# Patient Record
Sex: MALE | Race: WHITE | Employment: FULL TIME | URBAN - NONMETROPOLITAN AREA
[De-identification: names, ages, dates, MRNs, and addresses within clinical notes are randomized per-mention and may not be internally consistent; named-entity substitution may affect disease eponyms.]

---

## 2017-11-01 ENCOUNTER — APPOINTMENT (OUTPATIENT)
Dept: GENERAL RADIOLOGY | Age: 52
End: 2017-11-01
Payer: COMMERCIAL

## 2017-11-01 ENCOUNTER — HOSPITAL ENCOUNTER (EMERGENCY)
Age: 52
Discharge: HOME | End: 2017-11-01
Payer: COMMERCIAL

## 2017-11-01 VITALS
TEMPERATURE: 97 F | RESPIRATION RATE: 20 BRPM | WEIGHT: 195 LBS | DIASTOLIC BLOOD PRESSURE: 98 MMHG | BODY MASS INDEX: 30.61 KG/M2 | SYSTOLIC BLOOD PRESSURE: 159 MMHG | HEIGHT: 67 IN | HEART RATE: 69 BPM | OXYGEN SATURATION: 96 %

## 2017-11-01 DIAGNOSIS — M54.41 RIGHT-SIDED LOW BACK PAIN WITH RIGHT-SIDED SCIATICA, UNSPECIFIED CHRONICITY: Primary | ICD-10-CM

## 2017-11-01 PROCEDURE — 6370000000 HC RX 637 (ALT 250 FOR IP): Performed by: PHYSICIAN ASSISTANT

## 2017-11-01 PROCEDURE — 72100 X-RAY EXAM L-S SPINE 2/3 VWS: CPT

## 2017-11-01 PROCEDURE — 99283 EMERGENCY DEPT VISIT LOW MDM: CPT

## 2017-11-01 PROCEDURE — 6360000002 HC RX W HCPCS: Performed by: PHYSICIAN ASSISTANT

## 2017-11-01 PROCEDURE — 96372 THER/PROPH/DIAG INJ SC/IM: CPT

## 2017-11-01 PROCEDURE — 99282 EMERGENCY DEPT VISIT SF MDM: CPT | Performed by: PHYSICIAN ASSISTANT

## 2017-11-01 RX ORDER — HYDROCODONE BITARTRATE AND ACETAMINOPHEN 5; 325 MG/1; MG/1
1 TABLET ORAL ONCE
Status: COMPLETED | OUTPATIENT
Start: 2017-11-01 | End: 2017-11-01

## 2017-11-01 RX ORDER — ORPHENADRINE CITRATE 30 MG/ML
60 INJECTION INTRAMUSCULAR; INTRAVENOUS ONCE
Status: COMPLETED | OUTPATIENT
Start: 2017-11-01 | End: 2017-11-01

## 2017-11-01 RX ORDER — METHYLPREDNISOLONE SODIUM SUCCINATE 125 MG/2ML
125 INJECTION, POWDER, LYOPHILIZED, FOR SOLUTION INTRAMUSCULAR; INTRAVENOUS ONCE
Status: COMPLETED | OUTPATIENT
Start: 2017-11-01 | End: 2017-11-01

## 2017-11-01 RX ORDER — METHYLPREDNISOLONE 4 MG/1
TABLET ORAL
Qty: 1 KIT | Refills: 0 | Status: SHIPPED | OUTPATIENT
Start: 2017-11-01 | End: 2017-11-15

## 2017-11-01 RX ORDER — CYCLOBENZAPRINE HCL 10 MG
10 TABLET ORAL 3 TIMES DAILY PRN
Qty: 15 TABLET | Refills: 0 | Status: SHIPPED | OUTPATIENT
Start: 2017-11-01 | End: 2017-11-11

## 2017-11-01 RX ORDER — HYDROCODONE BITARTRATE AND ACETAMINOPHEN 5; 325 MG/1; MG/1
1 TABLET ORAL EVERY 6 HOURS PRN
Qty: 15 TABLET | Refills: 0 | Status: SHIPPED | OUTPATIENT
Start: 2017-11-01 | End: 2017-11-08

## 2017-11-01 RX ADMIN — HYDROCODONE BITARTRATE AND ACETAMINOPHEN 1 TABLET: 5; 325 TABLET ORAL at 13:28

## 2017-11-01 RX ADMIN — ORPHENADRINE CITRATE 60 MG: 30 INJECTION INTRAMUSCULAR; INTRAVENOUS at 12:27

## 2017-11-01 RX ADMIN — METHYLPREDNISOLONE SODIUM SUCCINATE 125 MG: 125 INJECTION, POWDER, FOR SOLUTION INTRAMUSCULAR; INTRAVENOUS at 12:30

## 2017-11-01 ASSESSMENT — PAIN SCALES - GENERAL: PAINLEVEL_OUTOF10: 3

## 2017-11-01 NOTE — ED PROVIDER NOTES
11/1/2017    Chief Complaint:   Chief Complaint   Patient presents with    Back Pain     radiating down right leg x 1 week, no injury       HPI: Gerald Kiser is a 50 y.o. male who presents to the emergency department  complaining of back pain. This pain has been present for 1 week. No trauma, twisting or lifting injury. Pain does  radiate down the right leg. The patient denies any bowel and/or bladder dysfunction, including incontinence and retention. The patient denies saddle anesthesia. This pain has not woken the patient at night. The patient denies tingling, numbness, and weakness. There have been no distal neurologic symptoms. Denies fever/chills. Denies recent trauma. PMH:   Past Medical History:   Diagnosis Date    Bulging lumbar disc        PSH:   Past Surgical History:   Procedure Laterality Date    DENTAL SURGERY         Meds:   Discharge Medication List as of 11/1/2017  1:31 PM          SH:   Social History     Social History    Marital status:      Spouse name: N/A    Number of children: N/A    Years of education: N/A     Social History Main Topics    Smoking status: Current Some Day Smoker    Smokeless tobacco: Never Used    Alcohol use Yes      Comment: occasional    Drug use: No    Sexual activity: Not Asked     Other Topics Concern    None     Social History Narrative    None        ROS: 10 system review of systems is negative except per HPI. Allergies: Review of patient's allergies indicates no known allergies. Physical Exam:   Triage vitals were reviewed. ED Triage Vitals [11/01/17 1135]   BP Temp Temp Source Pulse Resp SpO2 Height Weight   (!) 159/98 97 °F (36.1 °C) Temporal 69 20 96 % 5' 7\" (1.702 m) 195 lb (88.5 kg)       Nursing notes reviewed. Medication list, allergies, PMH, PSH reviewed. Constitutional: This patient is lying in bed in no acute distress. The patient is talking comfortably in complete sentences.      Eyes: Pupils are malignancy, epidural abscess, cauda equina syndrome, or other serious spinal cord pathology, as the patient does not have significant risk factors as listed in the HPI. I will advise the patient to follow up with their primary care team for continued pain management as needed. Laboratory Studies: Labs Reviewed - No data to display     Vitals:    11/01/17 1135   BP: (!) 159/98   Pulse: 69   Resp: 20   Temp: 97 °F (36.1 °C)   TempSrc: Temporal   SpO2: 96%   Weight: 195 lb (88.5 kg)   Height: 5' 7\" (1.702 m)           Final Diagnostic Impression:    1. Right-sided low back pain with right-sided sciatica, unspecified chronicity          ED Disposition/Plan:  DISPOSITION Decision to Discharge    Pt was told that if symptoms worsen or new symptoms develop they are to return to the emergency department. Patient to follow up. Patient  was educated on diagnosis and treatment plan. All of patient's questions were answered, and  the patient understands the discharge plan. I do not feel the patient has a life-threatening condition at this time. Patient is to be discharged. Brigida Khoury PATIENT REFERRED TO:  [address]   [address] [place] [id number] [phone]  Schedule an appointment as soon as possible for a visit         DISCHARGE MEDICATIONS:  Discharge Medication List as of 11/1/2017  1:31 PM      START taking these medications    Details   HYDROcodone-acetaminophen (NORCO) 5-325 MG per tablet Take 1 tablet by mouth every 6 hours as needed for Pain ., Disp-15 tablet, R-0Print      cyclobenzaprine (FLEXERIL) 10 MG tablet Take 1 tablet by mouth 3 times daily as needed for Muscle spasms, Disp-15 tablet, R-0Print      methylPREDNISolone (MEDROL, ELEN,) 4 MG tablet Take by mouth., Disp-1 kit, R-0Print                 DONNELL Stein [place]  11/01/17 7838

## 2017-11-02 ENCOUNTER — HOSPITAL ENCOUNTER (EMERGENCY)
Age: 52
Discharge: HOME | End: 2017-11-02
Attending: EMERGENCY MEDICINE
Payer: COMMERCIAL

## 2017-11-02 VITALS
BODY MASS INDEX: 29.35 KG/M2 | DIASTOLIC BLOOD PRESSURE: 88 MMHG | TEMPERATURE: 97.8 F | HEART RATE: 55 BPM | WEIGHT: 187 LBS | SYSTOLIC BLOOD PRESSURE: 134 MMHG | OXYGEN SATURATION: 95 % | RESPIRATION RATE: 18 BRPM | HEIGHT: 67 IN

## 2017-11-02 DIAGNOSIS — M54.31 SCIATICA OF RIGHT SIDE: Primary | ICD-10-CM

## 2017-11-02 DIAGNOSIS — M79.604 RIGHT LEG PAIN: ICD-10-CM

## 2017-11-02 PROCEDURE — 99282 EMERGENCY DEPT VISIT SF MDM: CPT

## 2017-11-02 PROCEDURE — 6360000002 HC RX W HCPCS: Performed by: EMERGENCY MEDICINE

## 2017-11-02 PROCEDURE — 20552 NJX 1/MLT TRIGGER POINT 1/2: CPT

## 2017-11-02 PROCEDURE — 6370000000 HC RX 637 (ALT 250 FOR IP): Performed by: EMERGENCY MEDICINE

## 2017-11-02 PROCEDURE — 96372 THER/PROPH/DIAG INJ SC/IM: CPT

## 2017-11-02 PROCEDURE — 99283 EMERGENCY DEPT VISIT LOW MDM: CPT | Performed by: EMERGENCY MEDICINE

## 2017-11-02 RX ORDER — IBUPROFEN 200 MG
600 TABLET ORAL ONCE
Status: COMPLETED | OUTPATIENT
Start: 2017-11-02 | End: 2017-11-02

## 2017-11-02 RX ORDER — MORPHINE SULFATE 10 MG/ML
8 INJECTION, SOLUTION INTRAMUSCULAR; INTRAVENOUS ONCE
Status: COMPLETED | OUTPATIENT
Start: 2017-11-02 | End: 2017-11-02

## 2017-11-02 RX ORDER — DEXAMETHASONE SODIUM PHOSPHATE 10 MG/ML
10 INJECTION INTRAMUSCULAR; INTRAVENOUS ONCE
Status: COMPLETED | OUTPATIENT
Start: 2017-11-02 | End: 2017-11-02

## 2017-11-02 RX ORDER — BUPIVACAINE HYDROCHLORIDE 5 MG/ML
10 INJECTION, SOLUTION PERINEURAL ONCE
Status: DISCONTINUED | OUTPATIENT
Start: 2017-11-02 | End: 2017-11-02 | Stop reason: HOSPADM

## 2017-11-02 RX ADMIN — DEXAMETHASONE SODIUM PHOSPHATE 10 MG: 10 INJECTION INTRAMUSCULAR; INTRAVENOUS at 18:12

## 2017-11-02 RX ADMIN — MORPHINE SULFATE 8 MG: 10 INJECTION, SOLUTION INTRAMUSCULAR; INTRAVENOUS at 18:12

## 2017-11-02 RX ADMIN — IBUPROFEN 600 MG: 200 TABLET, FILM COATED ORAL at 18:11

## 2017-11-02 ASSESSMENT — ENCOUNTER SYMPTOMS
CONSTIPATION: 0
DIARRHEA: 0
COUGH: 0
SHORTNESS OF BREATH: 0
RHINORRHEA: 0
VOMITING: 0
BACK PAIN: 1
SORE THROAT: 0
CHEST TIGHTNESS: 0
BLOOD IN STOOL: 0
TROUBLE SWALLOWING: 0
ABDOMINAL DISTENTION: 0
NAUSEA: 0
ABDOMINAL PAIN: 0

## 2017-11-02 ASSESSMENT — PAIN DESCRIPTION - DESCRIPTORS: DESCRIPTORS: CONSTANT;ACHING;NUMBNESS

## 2017-11-02 ASSESSMENT — PAIN DESCRIPTION - ORIENTATION: ORIENTATION: RIGHT

## 2017-11-02 ASSESSMENT — PAIN DESCRIPTION - PAIN TYPE: TYPE: ACUTE PAIN

## 2017-11-02 ASSESSMENT — PAIN DESCRIPTION - LOCATION: LOCATION: LEG

## 2017-11-02 ASSESSMENT — PAIN SCALES - GENERAL: PAINLEVEL_OUTOF10: 3

## 2017-11-02 NOTE — ED PROVIDER NOTES
side    2.  Right leg pain          DISPOSITION/PLAN   DISPOSITION Decision to Discharge    PATIENT REFERRED TO:  [place] [address]  [phone]    Schedule an appointment as soon as possible for a visit in 3 days        DISCHARGE MEDICATIONS:  Discharge Medication List as of 11/2/2017  7:16 PM             (Please note that portions of this note were completed with a voice recognition program.  Efforts were made to edit the dictations but occasionally words are mis-transcribed.)    Nichelle Santiago MD (electronically signed)  Attending Emergency Physician          Nichelle Santiago MD  11/02/17 2287

## 2017-11-02 NOTE — ED NOTES
Pt was seen here yesterday and discharged. Pt wife was at Dr Patel, [employer] office today with a family member and mentioned about her .  He instructed them to come to the ER and have the ER call him       Hamzah Rae RN  11/02/17 2611

## 2017-11-08 ENCOUNTER — TELEPHONE (OUTPATIENT)
Dept: NEUROSURGERY | Age: 52
End: 2017-11-08

## 2017-11-08 NOTE — TELEPHONE ENCOUNTER
Neurosurgical pre apt questionnaire     Referring physician? ER REFERRAL    Who is completing questionnaire? PATIENT     Has the pt had any previous spinal/brain surgeries? NO    If yes, Where was the surgery preformed? What was the surgery? Who was the surgeon? When was this surgery? Why is the pt not following up with previous surgeon? Is this a second opinion? Was a MRI preformed? NO    If not, Is there any reason a MRI cannot be performed? Is there metal anywhere in the body? If yes,  Where was the MRI preformed? What part of the body? When was it preformed? Note:If  was not the facility that performed the study,    the disc will need to be brought appoint. Physical Therapy? NO   If yes, where was PT completed? What is the duration of therapy? Pain Management? NO   If yes, where was pain mgt therapy? Is the patient still under contract with pain mgt? Who is the pain mgt physician? Is the pt currently taking anything for pain control? Employment Status ? 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 2 WEEKS   If unemployed, how long? Are you on disability? Symptoms?         RIGHT SCIATIC PAIN

## 2017-11-15 ENCOUNTER — OFFICE VISIT (OUTPATIENT)
Dept: NEUROSURGERY | Age: 52
End: 2017-11-15
Payer: COMMERCIAL

## 2017-11-15 ENCOUNTER — HOSPITAL ENCOUNTER (OUTPATIENT)
Dept: GENERAL RADIOLOGY | Age: 52
Discharge: HOME | End: 2017-11-15
Payer: COMMERCIAL

## 2017-11-15 VITALS
HEART RATE: 71 BPM | OXYGEN SATURATION: 99 % | BODY MASS INDEX: 28.72 KG/M2 | WEIGHT: 183 LBS | HEIGHT: 67 IN | SYSTOLIC BLOOD PRESSURE: 146 MMHG | DIASTOLIC BLOOD PRESSURE: 99 MMHG

## 2017-11-15 DIAGNOSIS — R20.0 NUMBNESS AND TINGLING OF RIGHT LEG: ICD-10-CM

## 2017-11-15 DIAGNOSIS — M25.551 RIGHT HIP PAIN: ICD-10-CM

## 2017-11-15 DIAGNOSIS — M25.551 RIGHT HIP PAIN: Primary | ICD-10-CM

## 2017-11-15 DIAGNOSIS — R20.2 NUMBNESS AND TINGLING OF RIGHT LEG: ICD-10-CM

## 2017-11-15 DIAGNOSIS — M79.604 RIGHT LEG PAIN: ICD-10-CM

## 2017-11-15 DIAGNOSIS — M79.18 RIGHT BUTTOCK PAIN: ICD-10-CM

## 2017-11-15 PROCEDURE — 72120 X-RAY BEND ONLY L-S SPINE: CPT

## 2017-11-15 PROCEDURE — 99204 OFFICE O/P NEW MOD 45 MIN: CPT | Performed by: NURSE PRACTITIONER

## 2017-11-15 ASSESSMENT — ENCOUNTER SYMPTOMS
BACK PAIN: 0
RESPIRATORY NEGATIVE: 1
HEARTBURN: 0
VOMITING: 0
NAUSEA: 0
DIARRHEA: 0
EYES NEGATIVE: 1
BLOOD IN STOOL: 0
ABDOMINAL PAIN: 0
CONSTIPATION: 1

## 2017-11-15 NOTE — PROGRESS NOTES
[facility] Neurosurgery  Office Visit    Patient:   Nadine Kiser  MR#:    [id number]      YOB: 1965  Date of Visit:   11/15/2017  Time of Note:                          10:38 AM  Primary/Referring Physician:  No primary care provider on file. 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 The pain does radiate into right buttock, posterolateral thigh, lateral aspect of shin, and to the ankle. His pain is mostly located in the right hip and leg. The patient complains of numbness along the same distribution as described above. He states that sitting makes the pain worse. He states that he has not really had any left leg pain just occasional left toes will tingle. He states that resting lying down would relieve the pain and in about 30 minutes the pain would go away. He states that he has been performed PT exercises that have helped the pain. He states the pain got so bad he went to the ED, they gave him a steroid injection and pain medications. They sent him home and he states the next day he got worse and went back to the ED, they gave him morphine and another injection in his right hip which he cannot recall what it was. He states that he has been missing work trying to do half days but he states that he is in excruciating pain. His pain is not changed when going from a seated to standing position. His pain is not changed with walking. His pain is improved when lying flat. Overall, indicative that the patient does have a mechanical nature to their pain. He states that 0% of his pain is located in the back and 100% is leg pain.     The patient states that he can no longer sit without pain which has dramatically affected his quality of life. The patient has underwent a non-operative treatment course that has included:  NSAIDs (ibuprofen)  Muscle Relaxers (flexeril)  Opiates (Norco)  Oral Steroids (medrol dose pack)  Physical Therapy with core strengthening  Chiropractic Manipulation (no change)  TENS unit    Of note he does occasionally use tobacco and does not take blood thinning medications. Past Medical History:   Diagnosis Date    Bulging lumbar disc     Hypertension        Past Surgical History:   Procedure Laterality Date    DENTAL SURGERY         No current outpatient prescriptions on file. No current facility-administered medications for this visit. Allergies:  Review of patient's allergies indicates no known allergies. Social History:   History   Smoking Status    Current Some Day Smoker   Smokeless Tobacco    Never Used     History   Alcohol Use    Yes     Comment: occasional         Family History:   History reviewed. No pertinent family history. REVIEW OF SYSTEMS:  Review of Systems   Constitutional: Negative. HENT: Negative. Eyes: Negative. Respiratory: Negative. Cardiovascular: Negative. Gastrointestinal: Positive for constipation. Negative for abdominal pain, blood in stool, diarrhea, heartburn, melena, nausea and vomiting. Bleeding per rectum   Musculoskeletal: Positive for myalgias. Negative for back pain, falls, joint pain and neck pain. Right leg pain   Skin: Negative. Neurological: Positive for tingling (right leg). Negative for dizziness, tremors, sensory change, speech change, focal weakness, seizures, loss of consciousness and headaches. Endo/Heme/Allergies: Negative. Psychiatric/Behavioral: Negative. PHYSICAL EXAM:  Vitals:    11/15/17 0914   BP: (!) 146/99   Pulse:    SpO2:      Constitutional: appears well-developed and well-nourished.    Eyes  conjunctiva normal.  Pupils react

## 2017-11-20 ENCOUNTER — TELEPHONE (OUTPATIENT)
Dept: NEUROSURGERY | Age: 52
End: 2017-11-20

## 2017-11-20 NOTE — TELEPHONE ENCOUNTER
Patient called wanting to let Taty Oglesby know that he is having his MRI tomorrow, he is having a considerable amount of pain and requested if Taty Oglesby could please call the patient to discuss the results as soon as she gets to review them, thank you.

## 2017-11-21 ENCOUNTER — HOSPITAL ENCOUNTER (OUTPATIENT)
Dept: MRI IMAGING | Age: 52
Discharge: HOME | End: 2017-11-21
Payer: COMMERCIAL

## 2017-11-21 DIAGNOSIS — M79.18 RIGHT BUTTOCK PAIN: ICD-10-CM

## 2017-11-21 DIAGNOSIS — R20.2 NUMBNESS AND TINGLING OF RIGHT LEG: ICD-10-CM

## 2017-11-21 DIAGNOSIS — M25.551 RIGHT HIP PAIN: ICD-10-CM

## 2017-11-21 DIAGNOSIS — R20.0 NUMBNESS AND TINGLING OF RIGHT LEG: ICD-10-CM

## 2017-11-21 DIAGNOSIS — M79.604 RIGHT LEG PAIN: ICD-10-CM

## 2017-11-21 PROCEDURE — 72148 MRI LUMBAR SPINE W/O DYE: CPT

## 2017-11-22 NOTE — TELEPHONE ENCOUNTER
Patient understood what Lj Mcmanus had said and I informed him that he would get more results with his follow up appointment. He stated his new PCP is Dr Chencho Vergara.  I moved his appt to the 12th since he is in a considerable amount of pain still

## 2017-11-28 ENCOUNTER — PREP FOR PROCEDURE (OUTPATIENT)
Dept: NEUROSURGERY | Age: 52
End: 2017-11-28

## 2017-11-28 ENCOUNTER — OFFICE VISIT (OUTPATIENT)
Dept: NEUROSURGERY | Age: 52
End: 2017-11-28
Payer: COMMERCIAL

## 2017-11-28 VITALS
HEART RATE: 76 BPM | DIASTOLIC BLOOD PRESSURE: 90 MMHG | HEIGHT: 67 IN | BODY MASS INDEX: 29.29 KG/M2 | OXYGEN SATURATION: 98 % | SYSTOLIC BLOOD PRESSURE: 152 MMHG | WEIGHT: 186.6 LBS

## 2017-11-28 DIAGNOSIS — M51.16 LUMBAR DISC HERNIATION WITH RADICULOPATHY: Primary | ICD-10-CM

## 2017-11-28 DIAGNOSIS — M79.604 PAIN OF RIGHT LOWER EXTREMITY: ICD-10-CM

## 2017-11-28 DIAGNOSIS — M48.061 FORAMINAL STENOSIS OF LUMBAR REGION: ICD-10-CM

## 2017-11-28 DIAGNOSIS — M51.36 DDD (DEGENERATIVE DISC DISEASE), LUMBAR: ICD-10-CM

## 2017-11-28 PROCEDURE — 99215 OFFICE O/P EST HI 40 MIN: CPT | Performed by: NEUROLOGICAL SURGERY

## 2017-11-28 RX ORDER — ACETAMINOPHEN 500 MG
1000 TABLET ORAL EVERY 6 HOURS PRN
COMMUNITY
End: 2019-04-25

## 2017-11-28 RX ORDER — SODIUM CHLORIDE 0.9 % (FLUSH) 0.9 %
10 SYRINGE (ML) INJECTION EVERY 12 HOURS SCHEDULED
Status: CANCELLED | OUTPATIENT
Start: 2017-11-28

## 2017-11-28 RX ORDER — SODIUM CHLORIDE 0.9 % (FLUSH) 0.9 %
10 SYRINGE (ML) INJECTION PRN
Status: CANCELLED | OUTPATIENT
Start: 2017-11-28

## 2017-11-28 RX ORDER — IBUPROFEN 200 MG
600 TABLET ORAL EVERY 6 HOURS PRN
COMMUNITY

## 2017-11-28 ASSESSMENT — ENCOUNTER SYMPTOMS
BACK PAIN: 0
BLOOD IN STOOL: 0
CONSTIPATION: 1
RESPIRATORY NEGATIVE: 1
HEARTBURN: 0
NAUSEA: 0
DIARRHEA: 0
ABDOMINAL PAIN: 0
VOMITING: 0
EYES NEGATIVE: 1

## 2017-11-28 NOTE — H&P
[facility] Neurosurgery  H&P     Patient:                                    Marcella Kiser  MR#:                                        [id number]              YOB: 1965  Date of Visit:                            11/28/2017  Time of Note:                          5:01 PM  Primary/Referring Physician:  Vilma Brandt MD   Note Author:                            Kristin Garza DO          Chief Complaint   Patient presents with    Follow-up       Reviewing MRI and Flex/Ex      11/28/2017: Mr. Kiser returns to clinic today to review the results of his MRI l umbar spine. Today he states that his pain has now been present for 5 weeks and is progressively getting worse. He states he can barely get comfortable now. He continues to describe his pain as right buttock, posterolateral thigh, lateral shin, and ankle/dorsum of foot.       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 The pain does radiate into right buttock, posterolateral thigh, lateral aspect of shin, and to the ankle. His pain is mostly located in the right hip and leg. The patient complains of numbness along the same distribution as described above. He states that sitting makes the pain worse. He states that he has not really had any left leg pain just occasional left toes will tingle. He states that resting lying down would relieve the pain and in about 30 minutes the pain would go away. He states that he has been performed PT exercises that have helped the pain.       He states the pain got so bad he went to the ED, they gave him a steroid injection and pain medications. They sent him home and he states the next day he got worse and went back to the ED, they gave him morphine and another injection in his right hip which he cannot recall what it was.   He states that he has been missing work trying to do half days but he states that he is in excruciating pain.       His pain is not changed when going from a seated to standing position. His pain is not changed with walking. His pain is improved when lying flat. Overall, indicative that the patient does have a mechanical nature to their pain. He states that 0% of his pain is located in the back and 100% is leg pain.     The patient states that he can no longer sit without pain which has dramatically affected his quality of life.      The patient has underwent a non-operative treatment course that has included:  NSAIDs (ibuprofen)  Muscle Relaxers (flexeril)  Opiates (Norco)  Oral Steroids (medrol dose pack)  Physical Therapy with core strengthening  Chiropractic Manipulation (no change)  TENS unit     Of note he does occasionally use tobacco and does not take blood thinning medications.                Past Medical History        Past Medical History:   Diagnosis Date    Bulging lumbar disc      Hypertension              Past Surgical History         Past Surgical History:   Procedure Laterality Date    DENTAL SURGERY                Current Facility-Administered Medications          Current Outpatient Prescriptions   Medication Sig Dispense Refill    ibuprofen (ADVIL;MOTRIN) 200 MG tablet Take 600 mg by mouth every 6 hours as needed for Pain        acetaminophen (TYLENOL) 500 MG tablet Take 1,000 mg by mouth every 6 hours as needed for Pain          No current facility-administered medications for this visit.             Allergies:  Review of patient's allergies indicates no known allergies.     Social History:       History   Smoking Status    Current Some Day Smoker   Smokeless Tobacco    Never Used           History   Alcohol Use    Yes       Comment: occasional            Family History:   Family History   History reviewed. No pertinent family history.        REVIEW OF SYSTEMS:  Review of Systems   Constitutional: Negative. HENT: Negative. MCV, PLTNo results found for: NA, K, CL, CO2, BUN, CREATININE, GLUCOSE, CALCIUM, PROT, LABALBU, BILITOT, ALKPHOS, AST, ALT, LABGLOM, GFRAA, AGRATIO, GLOBNo results found for: INR, PROTIME        Narrative   XR LUMBAR SPINE LIMITED 11/1/2017 11:00 AM   HISTORY: Low back pain   Comparison: None    Findings:    Frontal, lateral, and coned-down lateral views of the lumbar spine are   provided. There are presumed to be 5 lumbar vertebral bodies, with the   inferior-most visualized disc space being designated as L5-S1 for the   purpose of numbering. There is no evidence of fracture or subluxation. Vertebral body height   and alignment are well maintained. Multilevel loss of intervertebral   disc height with small marginal endplate osteophytes. There is lower   lumbar facet arthropathy with resultant neuroforaminal narrowing at   L4-5 and L5-S1. The visualized osseous pelvis and surrounding soft tissues are grossly   unremarkable.        Impression   Impression:    1. No acute osseous injury. Vertebral body heights are   well-maintained. 2. There is flattening of the normal lordosis with no visualized   listhesis. 3. Multilevel degenerative change with loss of disc height and facet   arthropathy. Signed by Dr Chantel Cleveland on 11/1/2017 1:26 PM      I have personally reviewed these images and my interpretation is: There is no bony injury noted     Narrative   EXAMINATION: MRI LUMBAR SPINE WO CONTRAST 11/21/2017 4:50 PM   HISTORY: MRI LUMBAR SPINE WO CONTRAST 11/21/2017 2:48 PM   HISTORY: M25.551   COMPARISON: None    TECHNIQUE: Multiplanar, multisequence MRI of the lumbar spine was   performed without the use of contrast.   FINDINGS:    Alignment: There are presumed to be 5 lumbar-type vertebrae, with the   most inferior being labeled as L5. Normal lumbar lordosis is   maintained. There is no evidence of listhesis or subluxation. Marrow signal: No pathologic marrow infiltrate is demonstrated.  The   vertebral body heights and posterior elements are maintained. Cord/Canal: The conus medullaris terminates at the level of L1-L2. The   spinal cord is normal in signal and morphology. Soft tissues: The surrounding soft tissues are unremarkable. Levels:    L1-L2: No disc bulge is present. No significant neuroforaminal or   central canal stenosis is seen. L2-L3: No disc bulge is present. No significant neuroforaminal or   central canal stenosis is seen. L3-L4: Facet joint hypertrophy is present. There is compromise of the   right intervertebral neural foramen due to facet joint hypertrophy and   a central and slightly rightward disc leftward disc is also present   however the intervertebral neural foramen is not compromised on the   left. Wolm Brodie L4-L5: Facet joint hypertrophy is noted. The exiting nerve abuts the   facet joint on the left. The facet joint also compromises the right   intervertebral neural foramen. The central canal is patent. Wolm Brdoie L5-S1: Facet joint hypertrophy abuts the exiting nerve on the left at   the L5-S1 level the right intervertebral neural foramen is markedly   compromised by a rightward disc and facet joint hypertrophy. .        Impression   1. Degenerative spondylosis as described above   Signed by Dr Wang Zamorano on 11/21/2017 4:53 PM      I have personally reviewed the images and my interpretation is: There is a far lateral disc herniation at L5-S1 on the right that results in significant compression of the right L5 nerve root.       ASSESSMENT:     Nadine Kiser is a 50 y.o. male with complaints of right buttock, hip, and leg pain that has been persistent for 3 weeks.           ICD-10-CM ICD-9-CM     1. Lumbar disc herniation with radiculopathy M51.16 722.10 APTT         CBC         Comprehensive Metabolic Panel         EKG 12 Lead         Protime-INR         Urinalysis         Type and Screen         XR CHEST STANDARD (2 VW)   2. Pain of right lower extremity M79.604 729. 5     3.

## 2017-11-28 NOTE — PROGRESS NOTES
neck pain. Right leg pain   Skin: Negative. Neurological: Positive for tingling (right leg). Negative for dizziness, tremors, sensory change, speech change, focal weakness, seizures, loss of consciousness and headaches. Endo/Heme/Allergies: Negative. Psychiatric/Behavioral: Negative. PHYSICAL EXAM:  Vitals:    11/28/17 1136   BP: (!) 152/90   Pulse:    SpO2:      Constitutional: appears well-developed and well-nourished. Eyes  conjunctiva normal.  Pupils react to light  Ear, nose, throat -hearing intact to finger rub, No scars, masses, or lesions over external nose or ears, no atrophy of tongue  Neck-symmetric, no masses noted, no jugular vein distension  Respiration- chest wall appears symmetric, good expansion, normal effort without use of accessory muscles  Musculoskeletal  no significant wasting of muscles noted, no bony deformities, gait no gross ataxia  Extremities-no clubbing, cyanosis or edema  Skin  warm, dry, and intact. No rash, erythema, or pallor. Psychiatric  mood, affect, and behavior appear normal.     Neurologic Examinaiton  Awake, Alert and oriented x 3  Normal speech pattern, following commands  Motor 5/5 all extremities  No deficits to light touch or pinprick sensation  Reflexes are 2+ and symmetric  No myofacial tenderness to palpation  Slightly antalgic Gait pattern    His right hip and thigh started to tingle and become uncomfortable while sitting on the exam table. DATA and IMAGING:    Nursing/pcp notes, imaging, labs, and vitals reviewed.      PT,OT and/or speech notes reviewed    No results found for: WBC, HGB, HCT, MCV, PLTNo results found for: NA, K, CL, CO2, BUN, CREATININE, GLUCOSE, CALCIUM, PROT, LABALBU, BILITOT, ALKPHOS, AST, ALT, LABGLOM, GFRAA, AGRATIO, GLOBNo results found for: INR, PROTIME      Narrative   XR LUMBAR SPINE LIMITED 11/1/2017 11:00 AM   HISTORY: Low back pain   Comparison: None    Findings:    Frontal, lateral, and coned-down lateral views of the lumbar spine are   provided. There are presumed to be 5 lumbar vertebral bodies, with the   inferior-most visualized disc space being designated as L5-S1 for the   purpose of numbering. There is no evidence of fracture or subluxation. Vertebral body height   and alignment are well maintained. Multilevel loss of intervertebral   disc height with small marginal endplate osteophytes. There is lower   lumbar facet arthropathy with resultant neuroforaminal narrowing at   L4-5 and L5-S1. The visualized osseous pelvis and surrounding soft tissues are grossly   unremarkable.        Impression   Impression:    1. No acute osseous injury. Vertebral body heights are   well-maintained. 2. There is flattening of the normal lordosis with no visualized   listhesis. 3. Multilevel degenerative change with loss of disc height and facet   arthropathy. Signed by Dr Dylan Barba on 11/1/2017 1:26 PM     I have personally reviewed these images and my interpretation is: There is no bony injury noted    Narrative   EXAMINATION: MRI LUMBAR SPINE WO CONTRAST 11/21/2017 4:50 PM   HISTORY: MRI LUMBAR SPINE WO CONTRAST 11/21/2017 2:48 PM   HISTORY: M25.551   COMPARISON: None    TECHNIQUE: Multiplanar, multisequence MRI of the lumbar spine was   performed without the use of contrast.   FINDINGS:    Alignment: There are presumed to be 5 lumbar-type vertebrae, with the   most inferior being labeled as L5. Normal lumbar lordosis is   maintained. There is no evidence of listhesis or subluxation. Marrow signal: No pathologic marrow infiltrate is demonstrated. The   vertebral body heights and posterior elements are maintained. Cord/Canal: The conus medullaris terminates at the level of L1-L2. The   spinal cord is normal in signal and morphology. Soft tissues: The surrounding soft tissues are unremarkable. Levels:    L1-L2: No disc bulge is present.  No significant neuroforaminal or   central canal stenosis He will need a Right L5-S1 microdiscectomy using a far lateral approach using minimally invasive technique     We discussed risks, complications, and expectations, including but not limited to infection, paralysis, bowel and bladder dysfunction, possible need for revision procedure, persistent pain, spinal fluid leak, stroke and death. In addition, the benefits of the surgery were thoroughly discussed and the patient demonstrated a deep understanding. The patient wishes to proceed with surgical intervention. We will schedule for surgery in the near future.     Davis Morales, DO

## 2017-11-29 ENCOUNTER — HOSPITAL ENCOUNTER (OUTPATIENT)
Dept: PREADMISSION TESTING | Age: 52
Discharge: HOME | End: 2017-11-29
Payer: COMMERCIAL

## 2017-11-29 ENCOUNTER — HOSPITAL ENCOUNTER (OUTPATIENT)
Dept: GENERAL RADIOLOGY | Age: 52
Discharge: HOME | End: 2017-11-29
Payer: COMMERCIAL

## 2017-11-29 DIAGNOSIS — M51.16 LUMBAR DISC HERNIATION WITH RADICULOPATHY: ICD-10-CM

## 2017-11-29 LAB
APTT: 25.8 SEC (ref 26–36.2)
BILIRUBIN URINE: NEGATIVE
BLOOD, URINE: NEGATIVE
CLARITY: CLEAR
COLOR: YELLOW
GLUCOSE URINE: NEGATIVE MG/DL
INR BLD: 0.95 (ref 0.88–1.18)
KETONES, URINE: NEGATIVE MG/DL
LEUKOCYTE ESTERASE, URINE: NEGATIVE
NITRITE, URINE: NEGATIVE
PH UA: 6.5
PROTEIN UA: NEGATIVE MG/DL
PROTHROMBIN TIME: 12.6 SEC (ref 12–14.6)
SPECIFIC GRAVITY UA: 1.01
UROBILINOGEN, URINE: 0.2 E.U./DL

## 2017-11-29 PROCEDURE — 81003 URINALYSIS AUTO W/O SCOPE: CPT

## 2017-11-29 PROCEDURE — 93005 ELECTROCARDIOGRAM TRACING: CPT

## 2017-11-29 PROCEDURE — 85730 THROMBOPLASTIN TIME PARTIAL: CPT

## 2017-11-29 PROCEDURE — 85610 PROTHROMBIN TIME: CPT

## 2017-11-29 PROCEDURE — 71020 XR CHEST STANDARD TWO VW: CPT

## 2017-11-29 RX ORDER — OXYCODONE AND ACETAMINOPHEN 10; 325 MG/1; MG/1
1 TABLET ORAL EVERY 8 HOURS PRN
Qty: 30 TABLET | Refills: 0 | Status: SHIPPED | OUTPATIENT
Start: 2017-11-29 | End: 2017-11-29 | Stop reason: SDUPTHER

## 2017-11-29 RX ORDER — OXYCODONE AND ACETAMINOPHEN 10; 325 MG/1; MG/1
1 TABLET ORAL EVERY 8 HOURS PRN
Qty: 30 TABLET | Refills: 0 | Status: ON HOLD | OUTPATIENT
Start: 2017-11-29 | End: 2017-12-04

## 2017-11-29 NOTE — TELEPHONE ENCOUNTER
Patient called wanting to know about pain medication. Patient stated that you had discussed this at visit.

## 2017-11-30 LAB
EKG P AXIS: 0 DEGREES
EKG P-R INTERVAL: 152 MS
EKG Q-T INTERVAL: 406 MS
EKG QRS DURATION: 100 MS
EKG QTC CALCULATION (BAZETT): 414 MS
EKG T AXIS: 7 DEGREES

## 2017-12-01 ENCOUNTER — TELEPHONE (OUTPATIENT)
Dept: NEUROSURGERY | Age: 52
End: 2017-12-01

## 2017-12-04 ENCOUNTER — APPOINTMENT (OUTPATIENT)
Dept: GENERAL RADIOLOGY | Age: 52
End: 2017-12-04
Attending: NEUROLOGICAL SURGERY
Payer: COMMERCIAL

## 2017-12-04 ENCOUNTER — TELEPHONE (OUTPATIENT)
Dept: NEUROSURGERY | Age: 52
End: 2017-12-04

## 2017-12-04 ENCOUNTER — ANESTHESIA EVENT (OUTPATIENT)
Dept: OPERATING ROOM | Age: 52
End: 2017-12-04
Payer: COMMERCIAL

## 2017-12-04 ENCOUNTER — HOSPITAL ENCOUNTER (OUTPATIENT)
Age: 52
Setting detail: OUTPATIENT SURGERY
Discharge: HOME | End: 2017-12-04
Attending: NEUROLOGICAL SURGERY | Admitting: NEUROLOGICAL SURGERY
Payer: COMMERCIAL

## 2017-12-04 ENCOUNTER — ANESTHESIA (OUTPATIENT)
Dept: OPERATING ROOM | Age: 52
End: 2017-12-04
Payer: COMMERCIAL

## 2017-12-04 VITALS
OXYGEN SATURATION: 99 % | TEMPERATURE: 97.8 F | HEART RATE: 86 BPM | SYSTOLIC BLOOD PRESSURE: 154 MMHG | BODY MASS INDEX: 29.03 KG/M2 | HEIGHT: 67 IN | WEIGHT: 185 LBS | DIASTOLIC BLOOD PRESSURE: 94 MMHG | RESPIRATION RATE: 16 BRPM

## 2017-12-04 VITALS
TEMPERATURE: 96.4 F | DIASTOLIC BLOOD PRESSURE: 63 MMHG | OXYGEN SATURATION: 100 % | RESPIRATION RATE: 10 BRPM | SYSTOLIC BLOOD PRESSURE: 100 MMHG

## 2017-12-04 PROBLEM — M51.27 LUMBOSACRAL DISC HERNIATION: Status: ACTIVE | Noted: 2017-12-04

## 2017-12-04 PROCEDURE — 2500000003 HC RX 250 WO HCPCS: Performed by: NEUROLOGICAL SURGERY

## 2017-12-04 PROCEDURE — 6360000002 HC RX W HCPCS: Performed by: NURSE ANESTHETIST, CERTIFIED REGISTERED

## 2017-12-04 PROCEDURE — 7100000001 HC PACU RECOVERY - ADDTL 15 MIN: Performed by: NEUROLOGICAL SURGERY

## 2017-12-04 PROCEDURE — 6360000002 HC RX W HCPCS

## 2017-12-04 PROCEDURE — 63056 DECOMPRESS SPINAL CORD LMBR: CPT | Performed by: NEUROLOGICAL SURGERY

## 2017-12-04 PROCEDURE — 2720000010 HC SURG SUPPLY STERILE: Performed by: NEUROLOGICAL SURGERY

## 2017-12-04 PROCEDURE — 3700000001 HC ADD 15 MINUTES (ANESTHESIA): Performed by: NEUROLOGICAL SURGERY

## 2017-12-04 PROCEDURE — 2500000003 HC RX 250 WO HCPCS: Performed by: NURSE ANESTHETIST, CERTIFIED REGISTERED

## 2017-12-04 PROCEDURE — 2720000001 HC MISC SURG SUPPLY STERILE $51-500: Performed by: NEUROLOGICAL SURGERY

## 2017-12-04 PROCEDURE — 3600000005 HC SURGERY LEVEL 5 BASE: Performed by: NEUROLOGICAL SURGERY

## 2017-12-04 PROCEDURE — 7100000011 HC PHASE II RECOVERY - ADDTL 15 MIN: Performed by: NEUROLOGICAL SURGERY

## 2017-12-04 PROCEDURE — 7100000000 HC PACU RECOVERY - FIRST 15 MIN: Performed by: NEUROLOGICAL SURGERY

## 2017-12-04 PROCEDURE — A6258 TRANSPARENT FILM >16<=48 IN: HCPCS | Performed by: NEUROLOGICAL SURGERY

## 2017-12-04 PROCEDURE — 3700000000 HC ANESTHESIA ATTENDED CARE: Performed by: NEUROLOGICAL SURGERY

## 2017-12-04 PROCEDURE — 2580000003 HC RX 258: Performed by: NEUROLOGICAL SURGERY

## 2017-12-04 PROCEDURE — 3600000015 HC SURGERY LEVEL 5 ADDTL 15MIN: Performed by: NEUROLOGICAL SURGERY

## 2017-12-04 PROCEDURE — 3209999900 FLUORO FOR SURGICAL PROCEDURES

## 2017-12-04 PROCEDURE — 6370000000 HC RX 637 (ALT 250 FOR IP): Performed by: NEUROLOGICAL SURGERY

## 2017-12-04 PROCEDURE — 6360000002 HC RX W HCPCS: Performed by: NEUROLOGICAL SURGERY

## 2017-12-04 PROCEDURE — 7100000010 HC PHASE II RECOVERY - FIRST 15 MIN: Performed by: NEUROLOGICAL SURGERY

## 2017-12-04 RX ORDER — MORPHINE SULFATE 1 MG/ML
4 INJECTION, SOLUTION EPIDURAL; INTRATHECAL; INTRAVENOUS EVERY 5 MIN PRN
Status: DISCONTINUED | OUTPATIENT
Start: 2017-12-04 | End: 2017-12-04 | Stop reason: HOSPADM

## 2017-12-04 RX ORDER — FENTANYL CITRATE 50 UG/ML
INJECTION, SOLUTION INTRAMUSCULAR; INTRAVENOUS PRN
Status: DISCONTINUED | OUTPATIENT
Start: 2017-12-04 | End: 2017-12-04 | Stop reason: SDUPTHER

## 2017-12-04 RX ORDER — HYDRALAZINE HYDROCHLORIDE 20 MG/ML
5 INJECTION INTRAMUSCULAR; INTRAVENOUS EVERY 10 MIN PRN
Status: DISCONTINUED | OUTPATIENT
Start: 2017-12-04 | End: 2017-12-04 | Stop reason: HOSPADM

## 2017-12-04 RX ORDER — SODIUM CHLORIDE 0.9 % (FLUSH) 0.9 %
10 SYRINGE (ML) INJECTION EVERY 12 HOURS SCHEDULED
Status: DISCONTINUED | OUTPATIENT
Start: 2017-12-04 | End: 2017-12-04 | Stop reason: HOSPADM

## 2017-12-04 RX ORDER — ROCURONIUM BROMIDE 10 MG/ML
INJECTION, SOLUTION INTRAVENOUS PRN
Status: DISCONTINUED | OUTPATIENT
Start: 2017-12-04 | End: 2017-12-04 | Stop reason: SDUPTHER

## 2017-12-04 RX ORDER — DIPHENHYDRAMINE HYDROCHLORIDE 50 MG/ML
12.5 INJECTION INTRAMUSCULAR; INTRAVENOUS
Status: DISCONTINUED | OUTPATIENT
Start: 2017-12-04 | End: 2017-12-04 | Stop reason: HOSPADM

## 2017-12-04 RX ORDER — OXYCODONE AND ACETAMINOPHEN 10; 325 MG/1; MG/1
1 TABLET ORAL
Status: COMPLETED | OUTPATIENT
Start: 2017-12-04 | End: 2017-12-04

## 2017-12-04 RX ORDER — LIDOCAINE HYDROCHLORIDE 10 MG/ML
1 INJECTION, SOLUTION EPIDURAL; INFILTRATION; INTRACAUDAL; PERINEURAL ONCE
Status: DISCONTINUED | OUTPATIENT
Start: 2017-12-04 | End: 2017-12-04 | Stop reason: HOSPADM

## 2017-12-04 RX ORDER — PROPOFOL 10 MG/ML
INJECTION, EMULSION INTRAVENOUS PRN
Status: DISCONTINUED | OUTPATIENT
Start: 2017-12-04 | End: 2017-12-04 | Stop reason: SDUPTHER

## 2017-12-04 RX ORDER — ONDANSETRON 2 MG/ML
INJECTION INTRAMUSCULAR; INTRAVENOUS PRN
Status: DISCONTINUED | OUTPATIENT
Start: 2017-12-04 | End: 2017-12-04 | Stop reason: SDUPTHER

## 2017-12-04 RX ORDER — OXYCODONE AND ACETAMINOPHEN 10; 325 MG/1; MG/1
1 TABLET ORAL EVERY 8 HOURS PRN
Qty: 30 TABLET | Refills: 0 | Status: SHIPPED | OUTPATIENT
Start: 2017-12-04 | End: 2018-01-03

## 2017-12-04 RX ORDER — DOCUSATE SODIUM 100 MG/1
100 CAPSULE, LIQUID FILLED ORAL 2 TIMES DAILY PRN
Qty: 30 CAPSULE | Refills: 1 | Status: SHIPPED | OUTPATIENT
Start: 2017-12-04 | End: 2019-04-25 | Stop reason: ALTCHOICE

## 2017-12-04 RX ORDER — MORPHINE SULFATE 4 MG/ML
INJECTION, SOLUTION INTRAMUSCULAR; INTRAVENOUS PRN
Status: DISCONTINUED | OUTPATIENT
Start: 2017-12-04 | End: 2017-12-04 | Stop reason: SDUPTHER

## 2017-12-04 RX ORDER — ENALAPRILAT 2.5 MG/2ML
1.25 INJECTION INTRAVENOUS
Status: DISCONTINUED | OUTPATIENT
Start: 2017-12-04 | End: 2017-12-04 | Stop reason: HOSPADM

## 2017-12-04 RX ORDER — METOCLOPRAMIDE HYDROCHLORIDE 5 MG/ML
10 INJECTION INTRAMUSCULAR; INTRAVENOUS
Status: DISCONTINUED | OUTPATIENT
Start: 2017-12-04 | End: 2017-12-04 | Stop reason: HOSPADM

## 2017-12-04 RX ORDER — LIDOCAINE HYDROCHLORIDE 10 MG/ML
INJECTION, SOLUTION INFILTRATION; PERINEURAL PRN
Status: DISCONTINUED | OUTPATIENT
Start: 2017-12-04 | End: 2017-12-04 | Stop reason: SDUPTHER

## 2017-12-04 RX ORDER — MEPERIDINE HYDROCHLORIDE 50 MG/ML
12.5 INJECTION INTRAMUSCULAR; INTRAVENOUS; SUBCUTANEOUS EVERY 5 MIN PRN
Status: DISCONTINUED | OUTPATIENT
Start: 2017-12-04 | End: 2017-12-04 | Stop reason: HOSPADM

## 2017-12-04 RX ORDER — MIDAZOLAM HYDROCHLORIDE 1 MG/ML
INJECTION INTRAMUSCULAR; INTRAVENOUS
Status: COMPLETED
Start: 2017-12-04 | End: 2017-12-04

## 2017-12-04 RX ORDER — SODIUM CHLORIDE, SODIUM LACTATE, POTASSIUM CHLORIDE, CALCIUM CHLORIDE 600; 310; 30; 20 MG/100ML; MG/100ML; MG/100ML; MG/100ML
INJECTION, SOLUTION INTRAVENOUS CONTINUOUS
Status: DISCONTINUED | OUTPATIENT
Start: 2017-12-04 | End: 2017-12-04 | Stop reason: HOSPADM

## 2017-12-04 RX ORDER — LABETALOL HYDROCHLORIDE 5 MG/ML
5 INJECTION, SOLUTION INTRAVENOUS EVERY 10 MIN PRN
Status: DISCONTINUED | OUTPATIENT
Start: 2017-12-04 | End: 2017-12-04 | Stop reason: HOSPADM

## 2017-12-04 RX ORDER — MORPHINE SULFATE 1 MG/ML
2 INJECTION, SOLUTION EPIDURAL; INTRATHECAL; INTRAVENOUS EVERY 5 MIN PRN
Status: DISCONTINUED | OUTPATIENT
Start: 2017-12-04 | End: 2017-12-04 | Stop reason: HOSPADM

## 2017-12-04 RX ORDER — METHYLPREDNISOLONE ACETATE 40 MG/ML
INJECTION, SUSPENSION INTRA-ARTICULAR; INTRALESIONAL; INTRAMUSCULAR; SOFT TISSUE PRN
Status: DISCONTINUED | OUTPATIENT
Start: 2017-12-04 | End: 2017-12-04 | Stop reason: HOSPADM

## 2017-12-04 RX ORDER — ONDANSETRON 4 MG/1
4 TABLET, FILM COATED ORAL EVERY 8 HOURS PRN
Qty: 30 TABLET | Refills: 1 | Status: SHIPPED | OUTPATIENT
Start: 2017-12-04 | End: 2019-04-25

## 2017-12-04 RX ORDER — DEXAMETHASONE SODIUM PHOSPHATE 10 MG/ML
INJECTION INTRAMUSCULAR; INTRAVENOUS PRN
Status: DISCONTINUED | OUTPATIENT
Start: 2017-12-04 | End: 2017-12-04 | Stop reason: SDUPTHER

## 2017-12-04 RX ORDER — PROMETHAZINE HYDROCHLORIDE 25 MG/ML
6.25 INJECTION, SOLUTION INTRAMUSCULAR; INTRAVENOUS
Status: DISCONTINUED | OUTPATIENT
Start: 2017-12-04 | End: 2017-12-04 | Stop reason: HOSPADM

## 2017-12-04 RX ORDER — SODIUM CHLORIDE 0.9 % (FLUSH) 0.9 %
10 SYRINGE (ML) INJECTION PRN
Status: DISCONTINUED | OUTPATIENT
Start: 2017-12-04 | End: 2017-12-04 | Stop reason: HOSPADM

## 2017-12-04 RX ADMIN — MORPHINE SULFATE 2 MG: 4 INJECTION INTRAVENOUS at 16:52

## 2017-12-04 RX ADMIN — PROPOFOL 150 MG: 10 INJECTION, EMULSION INTRAVENOUS at 15:07

## 2017-12-04 RX ADMIN — FENTANYL CITRATE 100 MCG: 50 INJECTION, SOLUTION INTRAMUSCULAR; INTRAVENOUS at 15:16

## 2017-12-04 RX ADMIN — LIDOCAINE HYDROCHLORIDE 50 MG: 10 INJECTION, SOLUTION INFILTRATION; PERINEURAL at 15:07

## 2017-12-04 RX ADMIN — ROCURONIUM BROMIDE 10 MG: 10 INJECTION INTRAVENOUS at 15:56

## 2017-12-04 RX ADMIN — MORPHINE SULFATE 2 MG: 4 INJECTION INTRAVENOUS at 17:00

## 2017-12-04 RX ADMIN — OXYCODONE HYDROCHLORIDE AND ACETAMINOPHEN 1 TABLET: 10; 325 TABLET ORAL at 17:49

## 2017-12-04 RX ADMIN — MIDAZOLAM 2 MG: 1 INJECTION INTRAMUSCULAR; INTRAVENOUS at 12:05

## 2017-12-04 RX ADMIN — SODIUM CHLORIDE, POTASSIUM CHLORIDE, SODIUM LACTATE AND CALCIUM CHLORIDE: 600; 310; 30; 20 INJECTION, SOLUTION INTRAVENOUS at 12:05

## 2017-12-04 RX ADMIN — MEPERIDINE HYDROCHLORIDE 12.5 MG: 50 INJECTION INTRAMUSCULAR; INTRAVENOUS; SUBCUTANEOUS at 17:17

## 2017-12-04 RX ADMIN — FENTANYL CITRATE 25 MCG: 50 INJECTION, SOLUTION INTRAMUSCULAR; INTRAVENOUS at 17:00

## 2017-12-04 RX ADMIN — MORPHINE SULFATE 4 MG: 4 INJECTION INTRAVENOUS at 16:48

## 2017-12-04 RX ADMIN — ROCURONIUM BROMIDE 50 MG: 10 INJECTION INTRAVENOUS at 15:07

## 2017-12-04 RX ADMIN — MORPHINE SULFATE 2 MG: 4 INJECTION INTRAVENOUS at 15:51

## 2017-12-04 RX ADMIN — SUGAMMADEX 160 MG: 100 INJECTION, SOLUTION INTRAVENOUS at 16:48

## 2017-12-04 RX ADMIN — CEFAZOLIN SODIUM 2 G: 2 SOLUTION INTRAVENOUS at 15:18

## 2017-12-04 RX ADMIN — MORPHINE SULFATE 2 MG: 4 INJECTION INTRAVENOUS at 16:55

## 2017-12-04 RX ADMIN — MORPHINE SULFATE 2 MG: 4 INJECTION INTRAVENOUS at 15:32

## 2017-12-04 RX ADMIN — FENTANYL CITRATE 150 MCG: 50 INJECTION, SOLUTION INTRAMUSCULAR; INTRAVENOUS at 15:07

## 2017-12-04 RX ADMIN — ONDANSETRON HYDROCHLORIDE 4 MG: 2 SOLUTION INTRAMUSCULAR; INTRAVENOUS at 15:42

## 2017-12-04 RX ADMIN — Medication 4 MG: at 17:13

## 2017-12-04 RX ADMIN — DEXAMETHASONE SODIUM PHOSPHATE 10 MG: 10 INJECTION INTRAMUSCULAR; INTRAVENOUS at 15:17

## 2017-12-04 ASSESSMENT — PAIN DESCRIPTION - PAIN TYPE: TYPE: ACUTE PAIN

## 2017-12-04 ASSESSMENT — PAIN SCALES - GENERAL
PAINLEVEL_OUTOF10: 7
PAINLEVEL_OUTOF10: 0
PAINLEVEL_OUTOF10: 6
PAINLEVEL_OUTOF10: 8
PAINLEVEL_OUTOF10: 0

## 2017-12-04 ASSESSMENT — PAIN DESCRIPTION - DESCRIPTORS: DESCRIPTORS: SPASM

## 2017-12-04 ASSESSMENT — PAIN DESCRIPTION - LOCATION: LOCATION: HIP

## 2017-12-04 ASSESSMENT — PAIN - FUNCTIONAL ASSESSMENT: PAIN_FUNCTIONAL_ASSESSMENT: 0-10

## 2017-12-04 ASSESSMENT — PAIN DESCRIPTION - ORIENTATION: ORIENTATION: LEFT

## 2017-12-04 ASSESSMENT — LIFESTYLE VARIABLES: SMOKING_STATUS: 0

## 2017-12-04 NOTE — H&P (VIEW-ONLY)
trying to do half days but he states that he is in excruciating pain.       His pain is not changed when going from a seated to standing position. His pain is not changed with walking. His pain is improved when lying flat. Overall, indicative that the patient does have a mechanical nature to their pain. He states that 0% of his pain is located in the back and 100% is leg pain.     The patient states that he can no longer sit without pain which has dramatically affected his quality of life.      The patient has underwent a non-operative treatment course that has included:  NSAIDs (ibuprofen)  Muscle Relaxers (flexeril)  Opiates (Norco)  Oral Steroids (medrol dose pack)  Physical Therapy with core strengthening  Chiropractic Manipulation (no change)  TENS unit     Of note he does occasionally use tobacco and does not take blood thinning medications.                Past Medical History        Past Medical History:   Diagnosis Date    Bulging lumbar disc      Hypertension              Past Surgical History         Past Surgical History:   Procedure Laterality Date    DENTAL SURGERY                Current Facility-Administered Medications          Current Outpatient Prescriptions   Medication Sig Dispense Refill    ibuprofen (ADVIL;MOTRIN) 200 MG tablet Take 600 mg by mouth every 6 hours as needed for Pain        acetaminophen (TYLENOL) 500 MG tablet Take 1,000 mg by mouth every 6 hours as needed for Pain          No current facility-administered medications for this visit.             Allergies:  Review of patient's allergies indicates no known allergies.     Social History:       History   Smoking Status    Current Some Day Smoker   Smokeless Tobacco    Never Used           History   Alcohol Use    Yes       Comment: occasional            Family History:   Family History   History reviewed. No pertinent family history.        REVIEW OF SYSTEMS:  Review of Systems   Constitutional: Negative. HENT: Negative. Eyes: Negative. Respiratory: Negative. Cardiovascular: Negative. Gastrointestinal: Positive for constipation. Negative for abdominal pain, blood in stool, diarrhea, heartburn, melena, nausea and vomiting. Bleeding per rectum   Musculoskeletal: Positive for myalgias. Negative for back pain, falls, joint pain and neck pain. Right leg pain   Skin: Negative. Neurological: Positive for tingling (right leg). Negative for dizziness, tremors, sensory change, speech change, focal weakness, seizures, loss of consciousness and headaches. Endo/Heme/Allergies: Negative. Psychiatric/Behavioral: Negative.          PHYSICAL EXAM:      Vitals:     11/28/17 1136   BP: (!) 152/90   Pulse:     SpO2:        Constitutional: appears well-developed and well-nourished. Eyes  conjunctiva normal.  Pupils react to light  Ear, nose, throat -hearing intact to finger rub, No scars, masses, or lesions over external nose or ears, no atrophy of tongue  Neck-symmetric, no masses noted, no jugular vein distension  Respiration- chest wall appears symmetric, good expansion, normal effort without use of accessory muscles  Musculoskeletal  no significant wasting of muscles noted, no bony deformities, gait no gross ataxia  Extremities-no clubbing, cyanosis or edema  Skin  warm, dry, and intact. No rash, erythema, or pallor.   Psychiatric  mood, affect, and behavior appear normal.      Neurologic Examinaiton  Awake, Alert and oriented x 3  Normal speech pattern, following commands  Motor 5/5 all extremities  No deficits to light touch or pinprick sensation  Reflexes are 2+ and symmetric  No myofacial tenderness to palpation  Slightly antalgic Gait pattern     His right hip and thigh started to tingle and become uncomfortable while sitting on the exam table.        DATA and IMAGING:     Nursing/pcp notes, imaging, labs, and vitals reviewed.      PT,OT and/or speech notes reviewed     No results found for: WBC, HGB, HCT,

## 2017-12-04 NOTE — TELEPHONE ENCOUNTER
Patient called an stated that his pain isn't as bad as it was he stated that he still has some numbness an tingling in his feet.  He was calling to Cancel his surgery I stated to the patient that it is up to him that his pain could come back patient then stated he would be here at 11:30am

## 2017-12-04 NOTE — OP NOTE
ligament was opened and was resected with a fine-angled Kerrison rongeur. This allowed exposure of the nerve root. Exploration beneath the nerve root revealed as anticipated that it would a disc herniation. The nerve root was decompressed. Inspection with the ball tip probe revealed the nerve root to be completely decompressed and there were no further fragments. Wound was then copiously irrigated with antibiotic solution and meticulous hemostasis was assured 40 mL of Depo-Medrol was applied. The tubular retractor was removed. The wound was closed in layered fashion. A sterile dressing was applied. The patient was returned to the stretcher in the supine position and was extubated. The patient then returned recovery room in a stable condition.

## 2017-12-04 NOTE — ANESTHESIA POSTPROCEDURE EVALUATION
Department of Anesthesiology  Postprocedure Note    Patient: Enrique Kiser  MRN: [id number]  YOB: 1965  Date of evaluation: 12/4/2017  Time:  5:03 PM     Procedure Summary     Date:  12/04/17 Room / Location:  [facility] OR  / [facility] OR    Anesthesia Start:  7998 Anesthesia Stop:  1807    Procedure:  L5-S1 Microdiscectomy using a far lateral approach (Right ) Diagnosis:  (L5-S1 Far lateral disc herniation)    Surgeon:  Odilia Hester DO Responsible Provider:  Sabas Kam CRNA    Anesthesia Type:  general ASA Status:  2          Anesthesia Type: general    Dorothy Phase I: Dorothy Score: 10    Dorothy Phase II:      Last vitals: Reviewed and per EMR flowsheets. Anesthesia Post Evaluation    Patient location during evaluation: PACU  Patient participation: complete - patient participated  Level of consciousness: awake and alert  Pain score: 0  Airway patency: patent  Nausea & Vomiting: no nausea and no vomiting  Complications: no  Cardiovascular status: hemodynamically stable and blood pressure returned to baseline  Respiratory status: acceptable and nasal cannula  Hydration status: stable  Comments: Vital Signs Stable. Exchanging well. PACU RN received care.

## 2017-12-04 NOTE — ANESTHESIA PRE PROCEDURE
Department of Anesthesiology  Preprocedure Note       Name:  Alem Kiser   Age:  50 y.o.  :  1965                                          MRN:  [id number]         Date:  2017      Surgeon: Kristel Abbott):  Timoteo Rodriguez DO    Procedure: Procedure(s):  L5-S1 Microdiscectomy using a far lateral approach    Medications prior to admission:   Prior to Admission medications    Medication Sig Start Date End Date Taking? Authorizing Provider   oxyCODONE-acetaminophen (PERCOCET)  MG per tablet Take 1 tablet by mouth every 8 hours as needed for Pain . 17 Yes Timoteo Rodriguez DO   ibuprofen (ADVIL;MOTRIN) 200 MG tablet Take 600 mg by mouth every 6 hours as needed for Pain   Yes Historical Provider, MD   acetaminophen (TYLENOL) 500 MG tablet Take 1,000 mg by mouth every 6 hours as needed for Pain   Yes Historical Provider, MD       Current medications:    Current Facility-Administered Medications   Medication Dose Route Frequency Provider Last Rate Last Dose    ceFAZolin (ANCEF) 2 g in dextrose 3 % 50 mL IVPB (duplex)  2 g Intravenous On [address] to [id number]        sodium chloride flush 0.9 % injection 10 mL  10 mL Intravenous 2 times per day Timoteo Rodriguez DO        sodium chloride flush 0.9 % injection 10 mL  10 mL Intravenous PRN Timoteo Rodriguez DO        lactated ringers infusion   Intravenous Continuous Timoteo Rodriguez DO        lidocaine PF 1 % injection 1 mL  1 mL Intradermal Once Timoteo Rodriguez DO           Allergies:  No Known Allergies    Problem List:  There is no problem list on file for this patient.       Past Medical History:        Diagnosis Date    Bulging lumbar disc     Hypertension        Past Surgical History:        Procedure Laterality Date    DENTAL SURGERY         Social History:    Social History   Substance Use Topics    Smoking status: Current Some Day Smoker    Smokeless tobacco: Never Used    Alcohol use Yes      Comment: occasional GI/Hepatic/Renal:   (+) GERD:,           Endo/Other:    (+) : arthritis:., .                 Abdominal:           Vascular:                                        Anesthesia Plan      general     ASA 2     (Vasovagal presyncope with IV, can start 2nd after induction)  Induction: intravenous. MIPS: Postoperative opioids intended and Prophylactic antiemetics administered. Anesthetic plan and risks discussed with patient. Plan discussed with CRNA.                   Bharathi Petty MD   12/4/2017

## 2017-12-04 NOTE — BRIEF OP NOTE
Brief Postoperative Note  ______________________________________________________________    Patient: Slick Del Castillo May  YOB: 1965  MRN: [id number]  Date of Procedure: 12/4/2017    Pre-Op Diagnosis: L5-S1 Far lateral disc herniation    Post-Op Diagnosis: Same       Procedure(s):  L5-S1 Microdiscectomy using a far lateral approach    Anesthesia: [unfilled]    Surgeon(s):  Denis Gay DO    Staff:  [employer]: Binh Sanabria; Elvia Lopez     Estimated Blood Loss: 25 mL    Complications: None    Specimens:   * No specimens in log *    Implants:  * No implants in log *      Drains:      Findings: Small disc herniation removed from the foramen    Denis Gay DO  Date: 12/4/2017  Time: 4:47 PM

## 2017-12-11 ENCOUNTER — OFFICE VISIT (OUTPATIENT)
Dept: NEUROSURGERY | Age: 52
End: 2017-12-11

## 2017-12-11 VITALS
DIASTOLIC BLOOD PRESSURE: 104 MMHG | HEIGHT: 67 IN | OXYGEN SATURATION: 99 % | HEART RATE: 64 BPM | BODY MASS INDEX: 29.03 KG/M2 | SYSTOLIC BLOOD PRESSURE: 156 MMHG | WEIGHT: 185 LBS

## 2017-12-11 DIAGNOSIS — Z48.89 ENCOUNTER FOR POST SURGICAL WOUND CHECK: Primary | ICD-10-CM

## 2017-12-11 DIAGNOSIS — Z98.890 S/P LUMBAR MICRODISCECTOMY: ICD-10-CM

## 2017-12-11 PROCEDURE — 99024 POSTOP FOLLOW-UP VISIT: CPT | Performed by: NURSE PRACTITIONER

## 2017-12-11 ASSESSMENT — ENCOUNTER SYMPTOMS
RESPIRATORY NEGATIVE: 1
GASTROINTESTINAL NEGATIVE: 1
EYES NEGATIVE: 1

## 2017-12-11 NOTE — PROGRESS NOTES
mouth every 6 hours as needed for Pain, Disp: , Rfl:     acetaminophen (TYLENOL) 500 MG tablet, Take 1,000 mg by mouth every 6 hours as needed for Pain, Disp: , Rfl:   Review of patient's allergies indicates no known allergies. Social History  History   Smoking Status    Current Some Day Smoker   Smokeless Tobacco    Never Used     History   Alcohol Use    Yes     Comment: occasional         History reviewed. No pertinent family history. Review of Systems   Constitutional: Negative. HENT: Negative. Eyes: Negative. Respiratory: Negative. Cardiovascular: Negative. Gastrointestinal: Negative. Genitourinary: Negative. Musculoskeletal: Negative. Skin: Negative. Neurological: Negative. Endo/Heme/Allergies: Negative. Psychiatric/Behavioral: Negative. PHYSICAL EXAM:  Vitals:    12/11/17 1010   BP: (!) 156/104   Pulse: 64   SpO2:      Constitutional: The patient appears well-developed and well-nourished.    Eyes  conjunctiva normal.  Conjugate gaze  Ear, nose, throat -No scars, masses, or lesions over external nose or ears, no atrophy of tongue  Neck-symmetric, no masses noted, no jugular vein distension  Respiration- chest wall appears symmetric, good expansion, normal effort without use of accessory muscles  Musculoskeletal  no significant wasting of muscles noted, no bony deformities, gait no gross ataxia  Extremities-no clubbing, cyanosis or edema  Skin  midline lumbar incision noted   Psychiatric  mood, affect, and behavior appear normal.     Neurologic Examinaiton  Awake, Alert and oriented x 3  Normal speech pattern, following commands  Motor 5/5 all extremities  No deficits to light touch or pinprick sensation    Normal Gait pattern      Wound:  C/D/I, no signs or symptoms of infection noted     DATA and IMAGING:    No results found for: WBC, HGB, HCT, MCV, PLTNo results found for: NA, K, CL, CO2, BUN, CREATININE, GLUCOSE, CALCIUM, PROT, LABALBU, BILITOT, ALKPHOS,

## 2017-12-21 ENCOUNTER — OFFICE VISIT (OUTPATIENT)
Dept: NEUROSURGERY | Age: 52
End: 2017-12-21

## 2017-12-21 VITALS
HEIGHT: 67 IN | BODY MASS INDEX: 29.82 KG/M2 | OXYGEN SATURATION: 100 % | SYSTOLIC BLOOD PRESSURE: 142 MMHG | DIASTOLIC BLOOD PRESSURE: 87 MMHG | HEART RATE: 58 BPM | WEIGHT: 190 LBS

## 2017-12-21 DIAGNOSIS — Z98.890 S/P LUMBAR MICRODISCECTOMY: Primary | ICD-10-CM

## 2017-12-21 PROCEDURE — 99024 POSTOP FOLLOW-UP VISIT: CPT | Performed by: NURSE PRACTITIONER

## 2017-12-21 ASSESSMENT — ENCOUNTER SYMPTOMS
EYES NEGATIVE: 1
RESPIRATORY NEGATIVE: 1
GASTROINTESTINAL NEGATIVE: 1

## 2017-12-21 NOTE — PROGRESS NOTES
Rfl:     acetaminophen (TYLENOL) 500 MG tablet, Take 1,000 mg by mouth every 6 hours as needed for Pain, Disp: , Rfl:   Review of patient's allergies indicates no known allergies. Social History  History   Smoking Status    Current Some Day Smoker   Smokeless Tobacco    Never Used     History   Alcohol Use    Yes     Comment: occasional         History reviewed. No pertinent family history. Review of Systems   Constitutional: Negative. HENT: Negative. Eyes: Negative. Respiratory: Negative. Cardiovascular: Negative. Gastrointestinal: Negative. Genitourinary: Negative. Musculoskeletal: Negative. Skin: Negative. Neurological: Negative. Endo/Heme/Allergies: Negative. Psychiatric/Behavioral: Negative. PHYSICAL EXAM:  Vitals:    12/21/17 0939   BP: (!) 142/87   Pulse: 58   SpO2:      Constitutional: The patient appears well-developed and well-nourished.    Eyes  conjunctiva normal.  Conjugate gaze  Ear, nose, throat -No scars, masses, or lesions over external nose or ears, no atrophy of tongue  Neck-symmetric, no masses noted, no jugular vein distension  Respiration- chest wall appears symmetric, good expansion, normal effort without use of accessory muscles  Musculoskeletal  no significant wasting of muscles noted, no bony deformities, gait no gross ataxia  Extremities-no clubbing, cyanosis or edema  Skin  midline lumbar incision noted   Psychiatric  mood, affect, and behavior appear normal.     Neurologic Examinaiton  Awake, Alert and oriented x 3  Normal speech pattern, following commands  Motor 5/5 all extremities  No deficits to light touch or pinprick sensation    Normal Gait pattern      Wound:  C/D/I, no signs or symptoms of infection noted     DATA and IMAGING:    No results found for: WBC, HGB, HCT, MCV, PLTNo results found for: NA, K, CL, CO2, BUN, CREATININE, GLUCOSE, CALCIUM, PROT, LABALBU, BILITOT, ALKPHOS, AST, ALT, LABGLOM, GFRAA, AGRATIO, GLOB  Lab

## 2019-03-07 ENCOUNTER — OFFICE VISIT (OUTPATIENT)
Dept: NEUROSURGERY | Age: 54
End: 2019-03-07
Payer: COMMERCIAL

## 2019-03-07 VITALS
SYSTOLIC BLOOD PRESSURE: 156 MMHG | DIASTOLIC BLOOD PRESSURE: 99 MMHG | BODY MASS INDEX: 29.98 KG/M2 | WEIGHT: 191 LBS | HEIGHT: 67 IN | HEART RATE: 84 BPM

## 2019-03-07 DIAGNOSIS — M51.36 DDD (DEGENERATIVE DISC DISEASE), LUMBAR: ICD-10-CM

## 2019-03-07 DIAGNOSIS — M79.604 PAIN OF RIGHT LOWER EXTREMITY: ICD-10-CM

## 2019-03-07 DIAGNOSIS — M48.061 FORAMINAL STENOSIS OF LUMBAR REGION: ICD-10-CM

## 2019-03-07 DIAGNOSIS — M51.16 LUMBAR DISC HERNIATION WITH RADICULOPATHY: Primary | ICD-10-CM

## 2019-03-07 PROCEDURE — 99214 OFFICE O/P EST MOD 30 MIN: CPT | Performed by: NEUROLOGICAL SURGERY

## 2019-03-07 ASSESSMENT — ENCOUNTER SYMPTOMS
EYES NEGATIVE: 1
RESPIRATORY NEGATIVE: 1
GASTROINTESTINAL NEGATIVE: 1

## 2019-03-08 ENCOUNTER — TELEPHONE (OUTPATIENT)
Dept: NEUROLOGY | Age: 54
End: 2019-03-08

## 2019-03-08 DIAGNOSIS — M48.061 FORAMINAL STENOSIS OF LUMBAR REGION: ICD-10-CM

## 2019-03-08 DIAGNOSIS — M51.16 LUMBAR DISC HERNIATION WITH RADICULOPATHY: Primary | ICD-10-CM

## 2019-03-19 ENCOUNTER — TELEPHONE (OUTPATIENT)
Dept: NEUROLOGY | Age: 54
End: 2019-03-19

## 2019-04-08 ENCOUNTER — TELEPHONE (OUTPATIENT)
Dept: NEUROSURGERY | Age: 54
End: 2019-04-08

## 2019-04-25 ENCOUNTER — OFFICE VISIT (OUTPATIENT)
Dept: NEUROSURGERY | Age: 54
End: 2019-04-25
Payer: COMMERCIAL

## 2019-04-25 VITALS
WEIGHT: 193 LBS | DIASTOLIC BLOOD PRESSURE: 87 MMHG | HEIGHT: 67 IN | SYSTOLIC BLOOD PRESSURE: 139 MMHG | HEART RATE: 55 BPM | BODY MASS INDEX: 30.29 KG/M2

## 2019-04-25 DIAGNOSIS — M51.36 DDD (DEGENERATIVE DISC DISEASE), LUMBAR: ICD-10-CM

## 2019-04-25 DIAGNOSIS — M51.16 LUMBAR DISC HERNIATION WITH RADICULOPATHY: Primary | ICD-10-CM

## 2019-04-25 DIAGNOSIS — M79.604 PAIN OF RIGHT LOWER EXTREMITY: ICD-10-CM

## 2019-04-25 DIAGNOSIS — M48.061 FORAMINAL STENOSIS OF LUMBAR REGION: ICD-10-CM

## 2019-04-25 DIAGNOSIS — Z98.890 S/P LUMBAR MICRODISCECTOMY: ICD-10-CM

## 2019-04-25 PROCEDURE — 99213 OFFICE O/P EST LOW 20 MIN: CPT | Performed by: NEUROLOGICAL SURGERY

## 2019-04-25 RX ORDER — GABAPENTIN 100 MG/1
1 CAPSULE ORAL 3 TIMES DAILY
COMMUNITY
Start: 2019-03-12 | End: 2019-04-25

## 2019-04-25 RX ORDER — TRAMADOL HYDROCHLORIDE 50 MG/1
1 TABLET ORAL EVERY 6 HOURS PRN
COMMUNITY
Start: 2019-03-01 | End: 2019-04-25

## 2019-04-25 RX ORDER — CYCLOBENZAPRINE HCL 10 MG
1 TABLET ORAL 2 TIMES DAILY PRN
COMMUNITY
Start: 2019-03-12 | End: 2019-04-25

## 2019-04-25 ASSESSMENT — ENCOUNTER SYMPTOMS
GASTROINTESTINAL NEGATIVE: 1
EYES NEGATIVE: 1
RESPIRATORY NEGATIVE: 1

## 2019-04-25 NOTE — PROGRESS NOTES
[facility] Office Visit      Chief Complaint   Patient presents with    Follow-up     6 week Eval after ANA       HISTORY OF PRESENT ILLNESS:      Naa Kiser is a 48 y.o. male [facility] who underwent a far lateral L5-S1 microdiscectomy for lumbar disc herniation on 12/04/2017. Prior to surgery he complained of severe right hip and leg pain that radiated into the right buttock, posterolateral thigh, lateral shin, and to the ankle/dorsum of the foot. He could barely get comfortable in any position. Today he states that he has gone to pain management with Dr. Cindi Escobar. He has received an ANA recently. He states that his right leg pain has much improved. He still has the leg pain occasionally and is daily, however, \"it is not like it was\". He reports that he is back to walking daily. He is trying to build up his endurance and stamina in order to return to work. He is now up to walking 1 mile a day. He reports that he has to walk about 3 miles per day at work. He will go back to work in about 3 weeks when his FMLA runs out. Past Medical History:   Diagnosis Date    Bulging lumbar disc     Hypertension        Past Surgical History:   Procedure Laterality Date    DENTAL SURGERY      LAMINEC/FACETECT/FORAMIN,CERVICAL 1 SEG Right 12/4/2017    L5-S1 Microdiscectomy using a far lateral approach performed by Liza Mckeon DO at [address] [facility] [place] OR        Medications    Current Outpatient Medications:     ibuprofen (ADVIL;MOTRIN) 200 MG tablet, Take 600 mg by mouth every 6 hours as needed for Pain, Disp: , Rfl:   Patient has no known allergies. Social History  Social History     Tobacco Use   Smoking Status Current Some Day Smoker   Smokeless Tobacco Never Used     Social History     Substance and Sexual Activity   Alcohol Use Yes    Comment: occasional       No family history on file. Review of Systems   Constitutional: Negative. HENT: Negative. Eyes: Negative. Respiratory: Negative. Cardiovascular: Negative. Gastrointestinal: Negative. Genitourinary: Negative. Musculoskeletal: Negative. Skin: Negative. Neurological: Negative. Endo/Heme/Allergies: Negative. Psychiatric/Behavioral: Negative. PHYSICAL EXAM:  Vitals:    04/25/19 1449   BP: 139/87   Pulse: 55     Constitutional: The patient appears well-developed and well-nourished. Eyes - conjunctiva normal.  Conjugate gaze  Ear, nose, throat -No scars, masses, or lesions over external nose or ears, no atrophy of tongue  Neck-symmetric, no masses noted, no jugular vein distension  Respiration- chest wall appears symmetric, good expansion, normal effort without use of accessory muscles  Musculoskeletal - no significant wasting of muscles noted, no bony deformities, gait no gross ataxia  Extremities-no clubbing, cyanosis or edema  Skin - midline lumbar incision noted   Psychiatric - mood, affect, and behavior appear normal.     Neurologic Examination  Awake, Alert and oriented x 3  Normal speech pattern, following commands  Motor 5/5 all extremities  No deficits to light touch or pinprick sensation    Slight antalgic gait     Wound: Well healed     DATA and IMAGING:    No results found for: WBC, HGB, HCT, MCV, PLTNo results found for: NA, K, CL, CO2, BUN, CREATININE, GLUCOSE, CALCIUM, PROT, LABALBU, BILITOT, ALKPHOS, AST, ALT, LABGLOM, GFRAA, AGRATIO, GLOB  Lab Results   Component Value Date    INR 0.95 11/29/2017    PROTIME 12.6 11/29/2017    No results found. MRI Lumbar Spine (2/28/19) Ryan Clay  I have personally reviewed the images and my interpretation is:  DDD throughout the lumbar spine  At L5-S1 there is severe right foraminal stenosis      ASSESSMENT:   Citlaly Kiser is a 50 y.o. male who underwent a far lateral L5-S1 microdiscectomy for lumbar disc herniation on 12/04/2017 and did well from that surgery, however, he has now redeveloped right leg pain that is starting to improve. PLAN:   -Continue pain management  -We will hold off on any neurosurgical intervention at this time   -Will return to work on 5/20/2019  -Follow up as needed     He would need a TLIF of L5-S1 using minimally invasive technique right in future           ICD-10-CM    1. Lumbar disc herniation with radiculopathy M51.16    2. Foraminal stenosis of lumbar region M99.83    3. Pain of right lower extremity M79.604    4. DDD (degenerative disc disease), lumbar M51.36    5.  S/P lumbar microdiscectomy P51.216         Yossi Babin,

## 2019-04-25 NOTE — LETTER
[facility]    Dr. Lauretha Leyden, STEVE Oates  [address] [place] [address], [address]  [place], [address] [facility] [address]: [phone]  F: [phone]      04/25/19      To Whom This May Concern: It is my medical recommendation that Favian Kiser may return to work with no restrictions starting on 5/20/19. Please do not hesitate to contact the office with any questions or concerns regarding this letter.         Sincerely,        Lauretha Leyden, DO Willo Mcgill, APRN

## (undated) DEVICE — NEEDLE SPNL 22GA L3.5IN BLK HUB S STL REG WALL FIT STYL W/

## (undated) DEVICE — BAG BND W36XL36IN TRNSPAR POLY GEN PURP W E BND CLSR TIDI

## (undated) DEVICE — TIBURON LAPAROTOMY DRAPE: Brand: CONVERTORS

## (undated) DEVICE — MICRO KOVER: Brand: UNBRANDED

## (undated) DEVICE — TOWEL,OR,DSP,ST,BLUE,DLX,4/PK,20PK/CS: Brand: MEDLINE

## (undated) DEVICE — STANDARD SURGICAL GOWN, L: Brand: CONVERTORS

## (undated) DEVICE — MASTISOL ADHESIVE LIQ 2/3ML

## (undated) DEVICE — GLOVE SURG SZ 75 L12IN FNGR THK94MIL TRNSLUC YEL LTX

## (undated) DEVICE — C-ARMOR C-ARM EQUIPMENT COVERS CLEAR STERILE UNIVERSAL FIT 12 PER CASE: Brand: C-ARMOR

## (undated) DEVICE — SUTURE VCRL SZ 3-0 L18IN ABSRB UD L26MM SH 1/2 CIR J864D

## (undated) DEVICE — FORCEP BPLR IRIS

## (undated) DEVICE — 3M™ STERI-STRIP™ REINFORCED ADHESIVE SKIN CLOSURES, R1547, 1/2 IN X 4 IN (12 MM X 100 MM), 6 STRIPS/ENVELOPE: Brand: 3M™ STERI-STRIP™

## (undated) DEVICE — E-Z CLEAN, NON-STICK, PTFE COATED, ELECTROSURGICAL BLADE ELECTRODE, MODIFIED EXTENDED INSULATION, 2.5 INCH (6.35 CM): Brand: MEGADYNE

## (undated) DEVICE — E-Z CLEAN, NON-STICK, PTFE COATED, ELECTROSURGICAL BLADE ELECTRODE, BAYONET, MODIFIED EXTENDED INSULATION, 6.5 INCH (16.5 CM): Brand: MEGADYNE

## (undated) DEVICE — Device

## (undated) DEVICE — TOOL T12MH25L LGD 12CM 2.5MM MATCH LG: Brand: MIDAS REX®

## (undated) DEVICE — SUTURE VCRL SZ 2-0 L27IN ABSRB VLT L26MM UR-6 5/8 CIR J602H

## (undated) DEVICE — NEURO CDS

## (undated) DEVICE — 3M™ TEGADERM™ TRANSPARENT FILM DRESSING FRAME STYLE, 1626W, 4 IN X 4-3/4 IN (10 CM X 12 CM), 50/CT 4CT/CASE: Brand: 3M™ TEGADERM™

## (undated) DEVICE — KIT POS PT CARE KT W/ GENTLE TCH WILSON +

## (undated) DEVICE — DISCONTINUED NO SUB IDED TG GLOVE SURG SENSICARE ALOE LT LF PF ST GRN SZ 8